# Patient Record
Sex: FEMALE | Race: WHITE | Employment: OTHER | URBAN - METROPOLITAN AREA
[De-identification: names, ages, dates, MRNs, and addresses within clinical notes are randomized per-mention and may not be internally consistent; named-entity substitution may affect disease eponyms.]

---

## 2017-02-04 ENCOUNTER — HOSPITAL ENCOUNTER (OUTPATIENT)
Dept: MAMMOGRAPHY | Age: 72
Discharge: HOME | End: 2017-02-04
Attending: FAMILY MEDICINE
Payer: MEDICARE

## 2017-02-04 ENCOUNTER — LAB ENCOUNTER (OUTPATIENT)
Dept: LAB | Age: 72
End: 2017-02-04
Attending: FAMILY MEDICINE
Payer: MEDICARE

## 2017-02-04 ENCOUNTER — OFFICE VISIT (OUTPATIENT)
Dept: FAMILY MEDICINE CLINIC | Facility: CLINIC | Age: 72
End: 2017-02-04

## 2017-02-04 VITALS
DIASTOLIC BLOOD PRESSURE: 82 MMHG | HEIGHT: 62 IN | BODY MASS INDEX: 43.79 KG/M2 | WEIGHT: 238 LBS | SYSTOLIC BLOOD PRESSURE: 122 MMHG | RESPIRATION RATE: 20 BRPM | HEART RATE: 80 BPM | TEMPERATURE: 98 F

## 2017-02-04 DIAGNOSIS — E89.0 HYPOTHYROIDISM, POSTOP: ICD-10-CM

## 2017-02-04 DIAGNOSIS — I10 BENIGN HYPERTENSION: ICD-10-CM

## 2017-02-04 DIAGNOSIS — I83.893 VARICOSE VEINS OF BOTH LEGS WITH EDEMA: ICD-10-CM

## 2017-02-04 DIAGNOSIS — Z12.11 SCREENING FOR COLON CANCER: ICD-10-CM

## 2017-02-04 DIAGNOSIS — E66.01 MORBID OBESITY DUE TO EXCESS CALORIES (HCC): ICD-10-CM

## 2017-02-04 DIAGNOSIS — R73.03 PREDIABETES: ICD-10-CM

## 2017-02-04 DIAGNOSIS — E55.9 VITAMIN D DEFICIENCY: ICD-10-CM

## 2017-02-04 DIAGNOSIS — E78.2 HYPERLIPIDEMIA, MIXED: ICD-10-CM

## 2017-02-04 DIAGNOSIS — Z12.31 VISIT FOR SCREENING MAMMOGRAM: ICD-10-CM

## 2017-02-04 DIAGNOSIS — Z11.8 SCREENING FOR CHLAMYDIAL DISEASE: ICD-10-CM

## 2017-02-04 DIAGNOSIS — Z23 NEED FOR PNEUMOCOCCAL VACCINATION: ICD-10-CM

## 2017-02-04 DIAGNOSIS — E89.0 HYPOTHYROIDISM, POSTOP: Primary | ICD-10-CM

## 2017-02-04 DIAGNOSIS — Z00.00 ROUTINE GENERAL MEDICAL EXAMINATION AT A HEALTH CARE FACILITY: ICD-10-CM

## 2017-02-04 LAB
25-HYDROXYVITAMIN D (TOTAL): 21.6 NG/ML (ref 30–100)
ALBUMIN SERPL-MCNC: 3.8 G/DL (ref 3.5–4.8)
ALP LIVER SERPL-CCNC: 64 U/L (ref 55–142)
ALT SERPL-CCNC: 36 U/L (ref 14–54)
AST SERPL-CCNC: 28 U/L (ref 15–41)
BASOPHILS # BLD AUTO: 0.03 X10(3) UL (ref 0–0.1)
BASOPHILS NFR BLD AUTO: 0.5 %
BILIRUB SERPL-MCNC: 0.5 MG/DL (ref 0.1–2)
BUN BLD-MCNC: 33 MG/DL (ref 8–20)
CALCIUM BLD-MCNC: 9.4 MG/DL (ref 8.3–10.3)
CHLORIDE: 105 MMOL/L (ref 101–111)
CHOLEST SMN-MCNC: 184 MG/DL (ref ?–200)
CO2: 25 MMOL/L (ref 22–32)
CREAT BLD-MCNC: 1.12 MG/DL (ref 0.55–1.02)
EOSINOPHIL # BLD AUTO: 0.15 X10(3) UL (ref 0–0.3)
EOSINOPHIL NFR BLD AUTO: 2.6 %
ERYTHROCYTE [DISTWIDTH] IN BLOOD BY AUTOMATED COUNT: 12.6 % (ref 11.5–16)
EST. AVERAGE GLUCOSE BLD GHB EST-MCNC: 131 MG/DL (ref 68–126)
GLUCOSE BLD-MCNC: 118 MG/DL (ref 70–99)
HBA1C MFR BLD HPLC: 6.2 % (ref ?–5.7)
HCT VFR BLD AUTO: 39.9 % (ref 34–50)
HDLC SERPL-MCNC: 42 MG/DL (ref 45–?)
HDLC SERPL: 4.38 {RATIO} (ref ?–4.44)
HGB BLD-MCNC: 12.9 G/DL (ref 12–16)
IMMATURE GRANULOCYTE COUNT: 0.02 X10(3) UL (ref 0–1)
IMMATURE GRANULOCYTE RATIO %: 0.4 %
LDLC SERPL CALC-MCNC: 105 MG/DL (ref ?–130)
LYMPHOCYTES # BLD AUTO: 1.72 X10(3) UL (ref 0.9–4)
LYMPHOCYTES NFR BLD AUTO: 30.1 %
M PROTEIN MFR SERPL ELPH: 8 G/DL (ref 6.1–8.3)
MCH RBC QN AUTO: 31.7 PG (ref 27–33.2)
MCHC RBC AUTO-ENTMCNC: 32.3 G/DL (ref 31–37)
MCV RBC AUTO: 98 FL (ref 81–100)
MONOCYTES # BLD AUTO: 0.63 X10(3) UL (ref 0.1–0.6)
MONOCYTES NFR BLD AUTO: 11 %
NEUTROPHIL ABS PRELIM: 3.16 X10 (3) UL (ref 1.3–6.7)
NEUTROPHILS # BLD AUTO: 3.16 X10(3) UL (ref 1.3–6.7)
NEUTROPHILS NFR BLD AUTO: 55.4 %
NONHDLC SERPL-MCNC: 142 MG/DL (ref ?–130)
PLATELET # BLD AUTO: 256 10(3)UL (ref 150–450)
POTASSIUM SERPL-SCNC: 4.3 MMOL/L (ref 3.6–5.1)
RBC # BLD AUTO: 4.07 X10(6)UL (ref 3.8–5.1)
RED CELL DISTRIBUTION WIDTH-SD: 45.1 FL (ref 35.1–46.3)
SODIUM SERPL-SCNC: 138 MMOL/L (ref 136–144)
TRIGLYCERIDES: 187 MG/DL (ref ?–150)
TSI SER-ACNC: 0.18 MIU/ML (ref 0.35–5.5)
VLDL: 37 MG/DL (ref 5–40)
WBC # BLD AUTO: 5.7 X10(3) UL (ref 4–13)

## 2017-02-04 PROCEDURE — 80053 COMPREHEN METABOLIC PANEL: CPT

## 2017-02-04 PROCEDURE — G0009 ADMIN PNEUMOCOCCAL VACCINE: HCPCS | Performed by: FAMILY MEDICINE

## 2017-02-04 PROCEDURE — 80061 LIPID PANEL: CPT

## 2017-02-04 PROCEDURE — 84443 ASSAY THYROID STIM HORMONE: CPT

## 2017-02-04 PROCEDURE — 87591 N.GONORRHOEAE DNA AMP PROB: CPT

## 2017-02-04 PROCEDURE — 85025 COMPLETE CBC W/AUTO DIFF WBC: CPT

## 2017-02-04 PROCEDURE — G0439 PPPS, SUBSEQ VISIT: HCPCS | Performed by: FAMILY MEDICINE

## 2017-02-04 PROCEDURE — 87491 CHLMYD TRACH DNA AMP PROBE: CPT

## 2017-02-04 PROCEDURE — 77067 SCR MAMMO BI INCL CAD: CPT

## 2017-02-04 PROCEDURE — 83036 HEMOGLOBIN GLYCOSYLATED A1C: CPT

## 2017-02-04 PROCEDURE — 96160 PT-FOCUSED HLTH RISK ASSMT: CPT | Performed by: FAMILY MEDICINE

## 2017-02-04 PROCEDURE — 82306 VITAMIN D 25 HYDROXY: CPT

## 2017-02-04 PROCEDURE — 36415 COLL VENOUS BLD VENIPUNCTURE: CPT

## 2017-02-04 PROCEDURE — 90732 PPSV23 VACC 2 YRS+ SUBQ/IM: CPT | Performed by: FAMILY MEDICINE

## 2017-02-04 RX ORDER — LEVOTHYROXINE SODIUM 0.15 MG/1
150 TABLET ORAL DAILY
Qty: 90 TABLET | Refills: 4 | Status: SHIPPED | OUTPATIENT
Start: 2017-02-04 | End: 2018-03-29

## 2017-02-04 RX ORDER — LOVASTATIN 40 MG/1
40 TABLET ORAL NIGHTLY
Qty: 90 TABLET | Refills: 4 | Status: SHIPPED | OUTPATIENT
Start: 2017-02-04 | End: 2018-03-29

## 2017-02-04 RX ORDER — LISINOPRIL AND HYDROCHLOROTHIAZIDE 25; 20 MG/1; MG/1
1 TABLET ORAL DAILY
Qty: 90 TABLET | Refills: 4 | Status: SHIPPED | OUTPATIENT
Start: 2017-02-04 | End: 2018-03-29

## 2017-02-04 NOTE — PATIENT INSTRUCTIONS
Nafisa olivier ci?obed oneil (controlling high blood pressure)  Esme ci?mikayla oneil (high blood pressure) (nadci?nienie) (hypertension) ravinder herrera \"anu dempsey? \". Ren hitesh dlatego, ?e wi?kszo? ? osób z nadci?vladimir holguin spr [place]? stres (stress)  · Znale?? czas na relaks i cieszenie si? ?yciem. ?love? si? ronny najcz??ciej. · Leatha Radha? rodzin? i przyjació? i dara zaniedbywa? swoich hobby. Ograniczy? spo?ywanie alkoholu i rzuci? palenie  · M??czy?ni: Jennifer Cordia nale?y pi? wi?cej ni?

## 2017-02-04 NOTE — PROGRESS NOTES
Natividad Russell is a 67year old female who presents for a MA Supervisit.     HPI:    Patient Care Team: Patient Care Team:  Eleonora Harrison MD as PCP - General    Patient Active Problem List:     Hypothyroidism, postop     Hyperlipidemia, mixed     Screening have you lost more than 10 pounds without trying?: 2 - No    Has your appetite been poor?: No    Type of Diet: Balanced    How does the patient maintain a good energy level?: Other (knee pain  limits exercise)    How would you describe your daily physical Not at all    Feeling down, depressed, or hopeless (over the last two weeks)?: Not at all    PHQ-2 SCORE: 0        Advance Directives     Do you have a healthcare power of ?: No    Do you have a living will?: No     Please go to \"Cognitive Assessm patient. Update Health Maintenance if applicable    Chlamydia  Annually if high risk No results found for: CHLAMYDIA No flowsheet data found.     Screening Mammogram      Mammogram  Annually Mammogram,1 Yr due on 02/18/2016 Update Health Maintenance if appl LDL-CHOLESTEROL (mg/dL (calc))   Date Value   01/29/2015 191*    No flowsheet data found. Dilated Eye exam  Annually No flowsheet data found. No flowsheet data found.     COPD      Spirometry Testing Annually No results found for this or any previo denies hx of anemia  ENDOCRINE: denies thyroid history  ALL/ASTHMA: denies hx of allergy or asthma    EXAM:   /82 mmHg  Pulse 80  Temp(Src) 97.8 °F (36.6 °C) (Oral)  Resp 20  Ht 62\"  Wt 238 lb  BMI 43.52 kg/m2     > BP Readings from Last 3 Encounter VITAMIN D, 25-HYDROXY; Future  Caltrate 600+D once a day recommended. Screening for colon cancer  -     INSURE FECAL GLOBIN by IMMUNOASSAY [[id number]];  Future  Pt refuses colonoscopy, all risk of not having the procedure including risk of colon cancer d/w t Risk/Benefits counseling performed    SUGGESTED VACCINATIONS - Influenza, Pneumococcal, Zoster, Tetanus     Immunization History   Administered Date(s) Administered   • HEP B, Adult 01/29/2015, 03/09/2015, 02/12/2016   • Pneumococcal (Prevnar 13) 01/29/201

## 2017-02-05 LAB
C TRACH DNA SPEC QL NAA+PROBE: NEGATIVE
N GONORRHOEA DNA SPEC QL NAA+PROBE: NEGATIVE

## 2017-02-07 ENCOUNTER — TELEPHONE (OUTPATIENT)
Dept: FAMILY MEDICINE CLINIC | Facility: CLINIC | Age: 72
End: 2017-02-07

## 2017-02-07 NOTE — TELEPHONE ENCOUNTER
----- Message from Oracio Concepcion MD sent at 2/6/2017  8:54 AM CST -----  Vit.  D 2000U a day, low sugar diet, prediabetes

## 2017-02-07 NOTE — TELEPHONE ENCOUNTER
Called and spoke with pt's dtr. Ok per HIPPA pt's dtr informed of lab results below. Pt's dtr states understanding and agrees to plan. Pt's dtr has no further questions at this time.

## 2017-09-28 ENCOUNTER — HOSPITAL ENCOUNTER (OUTPATIENT)
Dept: GENERAL RADIOLOGY | Age: 72
Discharge: HOME | End: 2017-09-28
Attending: FAMILY MEDICINE
Payer: MEDICARE

## 2017-09-28 ENCOUNTER — OFFICE VISIT (OUTPATIENT)
Dept: FAMILY MEDICINE CLINIC | Facility: CLINIC | Age: 72
End: 2017-09-28

## 2017-09-28 ENCOUNTER — TELEPHONE (OUTPATIENT)
Dept: FAMILY MEDICINE CLINIC | Facility: CLINIC | Age: 72
End: 2017-09-28

## 2017-09-28 VITALS
HEART RATE: 84 BPM | RESPIRATION RATE: 16 BRPM | WEIGHT: 240 LBS | SYSTOLIC BLOOD PRESSURE: 120 MMHG | HEIGHT: 62 IN | TEMPERATURE: 100 F | BODY MASS INDEX: 44.16 KG/M2 | DIASTOLIC BLOOD PRESSURE: 60 MMHG | OXYGEN SATURATION: 96 %

## 2017-09-28 DIAGNOSIS — M79.10 MUSCLE ACHE: ICD-10-CM

## 2017-09-28 DIAGNOSIS — R05.9 COUGH: ICD-10-CM

## 2017-09-28 DIAGNOSIS — J20.9 ACUTE BRONCHITIS, UNSPECIFIED ORGANISM: Primary | ICD-10-CM

## 2017-09-28 DIAGNOSIS — J01.00 ACUTE NON-RECURRENT MAXILLARY SINUSITIS: ICD-10-CM

## 2017-09-28 PROCEDURE — 87798 DETECT AGENT NOS DNA AMP: CPT | Performed by: FAMILY MEDICINE

## 2017-09-28 PROCEDURE — 71010 XR CHEST AP/PA (1 VIEW) (CPT=71010): CPT | Performed by: FAMILY MEDICINE

## 2017-09-28 PROCEDURE — 99214 OFFICE O/P EST MOD 30 MIN: CPT | Performed by: FAMILY MEDICINE

## 2017-09-28 PROCEDURE — 87502 INFLUENZA DNA AMP PROBE: CPT | Performed by: FAMILY MEDICINE

## 2017-09-28 RX ORDER — METHYLPREDNISOLONE 4 MG/1
TABLET ORAL
Qty: 1 KIT | Refills: 0 | Status: SHIPPED | OUTPATIENT
Start: 2017-09-28 | End: 2018-07-18 | Stop reason: ALTCHOICE

## 2017-09-28 RX ORDER — AZITHROMYCIN 250 MG/1
TABLET, FILM COATED ORAL
Qty: 6 TABLET | Refills: 0 | Status: SHIPPED | OUTPATIENT
Start: 2017-09-28 | End: 2018-07-18 | Stop reason: ALTCHOICE

## 2017-09-28 RX ORDER — CODEINE PHOSPHATE AND GUAIFENESIN 10; 100 MG/5ML; MG/5ML
5 SOLUTION ORAL 3 TIMES DAILY PRN
Qty: 120 ML | Refills: 0 | Status: SHIPPED
Start: 2017-09-28 | End: 2018-07-18 | Stop reason: ALTCHOICE

## 2017-09-28 NOTE — TELEPHONE ENCOUNTER
Daughter called and spoke with her. Daughter wanted to know if she could give patient another person's cheratussin until pharmacy gets patient's medication ready. Advised her that she cannot take anyone else's medication.   Advised daughter that we would

## 2017-09-28 NOTE — PROGRESS NOTES
Patient presents with:  Cough: X 1 week, cough, weak, fever, headache,       HPI:   Bhavna Camarillo is a 67year old female who presents for cough  for  7  days. Patient reports congestion, cough is keeping pt up at night, wheezing, prior history of bronchitis. fever, no chills. SKIN: no rashes, no hives. EYES:denies blurred vision or double vision,   HEENT: no earache, sore throat, mild sinus congestion. CHEST: no chest pains, no palpitations, no orthopnea.   LUNGS: denies shortness of breath with exertion or if sx's persist or worsen.

## 2017-09-29 ENCOUNTER — TELEPHONE (OUTPATIENT)
Dept: FAMILY MEDICINE CLINIC | Facility: CLINIC | Age: 72
End: 2017-09-29

## 2017-09-29 NOTE — TELEPHONE ENCOUNTER
Dtr notified per HIPPA,  of + Influenza A & below orders. All questions answered, she expresses understanding.

## 2017-12-06 ENCOUNTER — TELEPHONE (OUTPATIENT)
Dept: FAMILY MEDICINE CLINIC | Facility: CLINIC | Age: 72
End: 2017-12-06

## 2018-01-12 ENCOUNTER — TELEPHONE (OUTPATIENT)
Dept: FAMILY MEDICINE CLINIC | Facility: CLINIC | Age: 73
End: 2018-01-12

## 2018-03-29 DIAGNOSIS — E78.2 HYPERLIPIDEMIA, MIXED: ICD-10-CM

## 2018-03-29 DIAGNOSIS — I10 BENIGN HYPERTENSION: ICD-10-CM

## 2018-03-29 DIAGNOSIS — E89.0 HYPOTHYROIDISM, POSTOP: ICD-10-CM

## 2018-03-29 RX ORDER — LOVASTATIN 40 MG/1
TABLET ORAL
Qty: 90 TABLET | Refills: 0 | Status: SHIPPED | OUTPATIENT
Start: 2018-03-29 | End: 2018-07-01

## 2018-03-29 RX ORDER — LEVOTHYROXINE SODIUM 0.15 MG/1
TABLET ORAL
Qty: 90 TABLET | Refills: 0 | Status: SHIPPED | OUTPATIENT
Start: 2018-03-29 | End: 2018-07-01

## 2018-03-29 RX ORDER — LISINOPRIL AND HYDROCHLOROTHIAZIDE 25; 20 MG/1; MG/1
1 TABLET ORAL DAILY
Qty: 90 TABLET | Refills: 0 | Status: SHIPPED | OUTPATIENT
Start: 2018-03-29 | End: 2018-07-01

## 2018-03-29 NOTE — TELEPHONE ENCOUNTER
Patient's daughter called to check on status of 3 medications to be refill at the [facility].   Levothyroxine Sodium 150 MCG Oral Tab 90 tablet 4     Lisinopril-Hydrochlorothiazide 20-25 MG Oral Tab 90 tablet 4     Lovastatin 40 MG Oral Tab 90

## 2018-06-07 ENCOUNTER — TELEPHONE (OUTPATIENT)
Dept: FAMILY MEDICINE CLINIC | Facility: CLINIC | Age: 73
End: 2018-06-07

## 2018-07-01 DIAGNOSIS — E78.2 HYPERLIPIDEMIA, MIXED: ICD-10-CM

## 2018-07-01 DIAGNOSIS — I10 BENIGN HYPERTENSION: ICD-10-CM

## 2018-07-01 DIAGNOSIS — E89.0 HYPOTHYROIDISM, POSTOP: ICD-10-CM

## 2018-07-02 RX ORDER — LEVOTHYROXINE SODIUM 0.15 MG/1
TABLET ORAL
Qty: 90 TABLET | Refills: 0 | Status: SHIPPED | OUTPATIENT
Start: 2018-07-02 | End: 2018-07-18

## 2018-07-02 RX ORDER — LISINOPRIL AND HYDROCHLOROTHIAZIDE 25; 20 MG/1; MG/1
1 TABLET ORAL DAILY
Qty: 90 TABLET | Refills: 0 | Status: SHIPPED | OUTPATIENT
Start: 2018-07-02 | End: 2018-07-18

## 2018-07-02 RX ORDER — LOVASTATIN 40 MG/1
TABLET ORAL
Qty: 90 TABLET | Refills: 0 | Status: SHIPPED | OUTPATIENT
Start: 2018-07-02 | End: 2018-07-18

## 2018-07-02 NOTE — TELEPHONE ENCOUNTER
Received a call from daughter Elvin Allie is on HIPPA. She is checking the status.  Please call as soon as they are approved or denied at [phone]

## 2018-07-02 NOTE — TELEPHONE ENCOUNTER
Medication(s) to Refill:   Pending Prescriptions Disp Refills    LEVOTHYROXINE SODIUM 150 MCG Oral Tab [Pharmacy Med Name: LEVOTHYROXINE 0.150MG (150MCG) TAB] 90 tablet 0     Sig: TAKE 1 TABLET(150 MCG) BY MOUTH DAILY      LISINOPRIL-HYDROCHLOROTHIAZIDE 20

## 2018-07-02 NOTE — TELEPHONE ENCOUNTER
PSR: Pls contact pt daughter Sarah Baldwin to schedule pt for OV follow up. Refills may be dependent on follow up. (please see msgs below). Thank you.

## 2018-07-18 ENCOUNTER — HOSPITAL ENCOUNTER (OUTPATIENT)
Dept: MAMMOGRAPHY | Age: 73
Discharge: HOME | End: 2018-07-18
Attending: FAMILY MEDICINE
Payer: MEDICARE

## 2018-07-18 ENCOUNTER — OFFICE VISIT (OUTPATIENT)
Dept: FAMILY MEDICINE CLINIC | Facility: CLINIC | Age: 73
End: 2018-07-18

## 2018-07-18 ENCOUNTER — LAB ENCOUNTER (OUTPATIENT)
Dept: LAB | Age: 73
End: 2018-07-18
Attending: FAMILY MEDICINE
Payer: MEDICARE

## 2018-07-18 VITALS
RESPIRATION RATE: 22 BRPM | BODY MASS INDEX: 42.51 KG/M2 | HEIGHT: 62 IN | WEIGHT: 231 LBS | OXYGEN SATURATION: 99 % | DIASTOLIC BLOOD PRESSURE: 86 MMHG | HEART RATE: 84 BPM | TEMPERATURE: 99 F | SYSTOLIC BLOOD PRESSURE: 128 MMHG

## 2018-07-18 DIAGNOSIS — Z12.31 VISIT FOR SCREENING MAMMOGRAM: ICD-10-CM

## 2018-07-18 DIAGNOSIS — Z12.11 SCREENING FOR COLON CANCER: ICD-10-CM

## 2018-07-18 DIAGNOSIS — E78.2 HYPERLIPIDEMIA, MIXED: ICD-10-CM

## 2018-07-18 DIAGNOSIS — Z12.31 VISIT FOR SCREENING MAMMOGRAM: Primary | ICD-10-CM

## 2018-07-18 DIAGNOSIS — E66.01 MORBID OBESITY DUE TO EXCESS CALORIES (HCC): ICD-10-CM

## 2018-07-18 DIAGNOSIS — I83.893 VARICOSE VEINS OF BOTH LEGS WITH EDEMA: ICD-10-CM

## 2018-07-18 DIAGNOSIS — E89.0 HYPOTHYROIDISM, POSTOP: ICD-10-CM

## 2018-07-18 DIAGNOSIS — R73.03 PREDIABETES: ICD-10-CM

## 2018-07-18 DIAGNOSIS — I10 BENIGN HYPERTENSION: ICD-10-CM

## 2018-07-18 LAB
ALBUMIN SERPL-MCNC: 3.8 G/DL (ref 3.5–4.8)
ALP LIVER SERPL-CCNC: 62 U/L (ref 55–142)
ALT SERPL-CCNC: 48 U/L (ref 14–54)
AST SERPL-CCNC: 35 U/L (ref 15–41)
BASOPHILS # BLD AUTO: 0.05 X10(3) UL (ref 0–0.1)
BASOPHILS NFR BLD AUTO: 0.8 %
BILIRUB SERPL-MCNC: 0.4 MG/DL (ref 0.1–2)
BUN BLD-MCNC: 29 MG/DL (ref 8–20)
CALCIUM BLD-MCNC: 9.9 MG/DL (ref 8.3–10.3)
CHLORIDE: 106 MMOL/L (ref 101–111)
CHOLEST SMN-MCNC: 178 MG/DL (ref ?–200)
CO2: 28 MMOL/L (ref 22–32)
CREAT BLD-MCNC: 1.22 MG/DL (ref 0.55–1.02)
EOSINOPHIL # BLD AUTO: 0.18 X10(3) UL (ref 0–0.3)
EOSINOPHIL NFR BLD AUTO: 2.8 %
ERYTHROCYTE [DISTWIDTH] IN BLOOD BY AUTOMATED COUNT: 12.4 % (ref 11.5–16)
GLUCOSE BLD-MCNC: 104 MG/DL (ref 70–99)
HCT VFR BLD AUTO: 42 % (ref 34–50)
HDLC SERPL-MCNC: 47 MG/DL (ref 45–?)
HDLC SERPL: 3.79 {RATIO} (ref ?–4.44)
HGB BLD-MCNC: 13.2 G/DL (ref 12–16)
IMMATURE GRANULOCYTE COUNT: 0.02 X10(3) UL (ref 0–1)
IMMATURE GRANULOCYTE RATIO %: 0.3 %
LDLC SERPL CALC-MCNC: 91 MG/DL (ref ?–130)
LYMPHOCYTES # BLD AUTO: 2.15 X10(3) UL (ref 0.9–4)
LYMPHOCYTES NFR BLD AUTO: 34 %
M PROTEIN MFR SERPL ELPH: 8.4 G/DL (ref 6.1–8.3)
MCH RBC QN AUTO: 30.3 PG (ref 27–33.2)
MCHC RBC AUTO-ENTMCNC: 31.4 G/DL (ref 31–37)
MCV RBC AUTO: 96.6 FL (ref 81–100)
MONOCYTES # BLD AUTO: 0.6 X10(3) UL (ref 0.1–1)
MONOCYTES NFR BLD AUTO: 9.5 %
NEUTROPHIL ABS PRELIM: 3.32 X10 (3) UL (ref 1.3–6.7)
NEUTROPHILS # BLD AUTO: 3.32 X10(3) UL (ref 1.3–6.7)
NEUTROPHILS NFR BLD AUTO: 52.6 %
NONHDLC SERPL-MCNC: 131 MG/DL (ref ?–130)
PLATELET # BLD AUTO: 257 10(3)UL (ref 150–450)
POTASSIUM SERPL-SCNC: 4.5 MMOL/L (ref 3.6–5.1)
RBC # BLD AUTO: 4.35 X10(6)UL (ref 3.8–5.1)
RED CELL DISTRIBUTION WIDTH-SD: 44.2 FL (ref 35.1–46.3)
SODIUM SERPL-SCNC: 141 MMOL/L (ref 136–144)
TRIGL SERPL-MCNC: 199 MG/DL (ref ?–150)
TSI SER-ACNC: 0.14 MIU/ML (ref 0.35–5.5)
VLDLC SERPL CALC-MCNC: 40 MG/DL (ref 5–40)
WBC # BLD AUTO: 6.3 X10(3) UL (ref 4–13)

## 2018-07-18 PROCEDURE — 80053 COMPREHEN METABOLIC PANEL: CPT

## 2018-07-18 PROCEDURE — 96160 PT-FOCUSED HLTH RISK ASSMT: CPT | Performed by: FAMILY MEDICINE

## 2018-07-18 PROCEDURE — 77067 SCR MAMMO BI INCL CAD: CPT | Performed by: FAMILY MEDICINE

## 2018-07-18 PROCEDURE — 85025 COMPLETE CBC W/AUTO DIFF WBC: CPT

## 2018-07-18 PROCEDURE — G0439 PPPS, SUBSEQ VISIT: HCPCS | Performed by: FAMILY MEDICINE

## 2018-07-18 PROCEDURE — 80061 LIPID PANEL: CPT

## 2018-07-18 PROCEDURE — 84443 ASSAY THYROID STIM HORMONE: CPT

## 2018-07-18 PROCEDURE — 36415 COLL VENOUS BLD VENIPUNCTURE: CPT

## 2018-07-18 RX ORDER — LEVOTHYROXINE SODIUM 0.15 MG/1
TABLET ORAL
Qty: 90 TABLET | Refills: 4 | Status: SHIPPED | OUTPATIENT
Start: 2018-07-18 | End: 2018-07-31 | Stop reason: DRUGHIGH

## 2018-07-18 RX ORDER — LOVASTATIN 40 MG/1
TABLET ORAL
Qty: 90 TABLET | Refills: 4 | Status: SHIPPED | OUTPATIENT
Start: 2018-07-18 | End: 2019-08-05

## 2018-07-18 RX ORDER — LISINOPRIL AND HYDROCHLOROTHIAZIDE 25; 20 MG/1; MG/1
1 TABLET ORAL DAILY
Qty: 90 TABLET | Refills: 4 | Status: SHIPPED | OUTPATIENT
Start: 2018-07-18 | End: 2019-08-05

## 2018-07-18 NOTE — PROGRESS NOTES
REASON FOR VISIT:    Pam Espino is a 68year old female who presents for a MA Supervisit.     HPI:     Patient Care Team: Patient Care Team:  Owen Hdz MD as PCP - General    Patient Active Problem List:     Hypothyroidism, postop     Hyperlipidemia daily physical activity?: Light  How would you describe your current health state?: Good  How do you maintain positive mental well-being?: Social Interaction; Visiting Friends; Visiting Family (reads,cooks,watches polish movies)  If you are a male age 38-65 118 (H)   ----------  GLUCOSE (mg/dL)   Date Value   01/29/2015 116 (H)   ----------   Cardiovascular Disease Screening    LDL Annually LDL-CHOLESTEROL (mg/dL (calc))   Date Value   01/29/2015 191 (H)     LDL Cholesterol (mg/dL)   Date Value   02/04/2017 1 or double vision  HEENT: denies nasal congestion, sinus pain or ST  LUNGS: denies shortness of breath with exertion  CARDIOVASCULAR: denies chest pain on exertion  GI: denies abdominal pain, denies heartburn  : denies dysuria, vaginal discharge or itchin side effects vs benefits d/w the pt. Medications:   -     Lisinopril-Hydrochlorothiazide 20-25 MG Oral Tab; Take 1 tablet by mouth daily.     Hyperlipidemia, mixed  -     Lovastatin 40 MG Oral Tab; TAKE 1 TABLET(40 MG) BY MOUTH EVERY NIGHT  -     LIPID CASTILLO

## 2018-07-19 ENCOUNTER — TELEPHONE (OUTPATIENT)
Dept: FAMILY MEDICINE CLINIC | Facility: CLINIC | Age: 73
End: 2018-07-19

## 2018-07-19 ENCOUNTER — MED REC SCAN ONLY (OUTPATIENT)
Dept: FAMILY MEDICINE CLINIC | Facility: CLINIC | Age: 73
End: 2018-07-19

## 2018-07-19 DIAGNOSIS — E89.0 HYPOTHYROIDISM, POSTOP: Primary | ICD-10-CM

## 2018-07-19 NOTE — TELEPHONE ENCOUNTER
----- Message from Lisy Bowden MD sent at 7/19/2018  9:24 AM CDT -----  Pt needs Synthroid 137ug a day not 150, recheck TSH in 2 months. Low lipid diet, proper hydration.

## 2018-07-26 NOTE — TELEPHONE ENCOUNTER
PSR if patient calls back please find out if  is needed. If so we will need to call her back with the . 2nd attempt  Called patient using interpretation line. ID# [id number].  left patient a VM to call back.

## 2018-07-31 RX ORDER — LEVOTHYROXINE SODIUM 137 UG/1
137 TABLET ORAL
Qty: 90 TABLET | Refills: 0 | Status: SHIPPED | OUTPATIENT
Start: 2018-07-31 | End: 2019-01-10

## 2018-07-31 NOTE — TELEPHONE ENCOUNTER
Spoke to dtr per HIPPA & notified her of lab results & below orders. Rx sent. Lab in [facility]. All questions answered, dtr expresses understanding.

## 2019-01-11 RX ORDER — LEVOTHYROXINE SODIUM 137 UG/1
TABLET ORAL
Qty: 90 TABLET | Refills: 0 | Status: SHIPPED | OUTPATIENT
Start: 2019-01-11 | End: 2019-05-08

## 2019-03-21 PROBLEM — N18.30 CKD (CHRONIC KIDNEY DISEASE) STAGE 3, GFR 30-59 ML/MIN (HCC): Status: ACTIVE | Noted: 2019-03-21

## 2019-04-09 RX ORDER — LEVOTHYROXINE SODIUM 137 UG/1
TABLET ORAL
Qty: 30 TABLET | Refills: 0 | OUTPATIENT
Start: 2019-04-09

## 2019-04-09 NOTE — TELEPHONE ENCOUNTER
Pt unknown to myself. She is a MA pt who has chosen me as PCP. Needs appointment. Has been called to make new pt visit appt. Will not refill meds until seen.

## 2019-05-07 PROBLEM — M25.561 CHRONIC PAIN OF BOTH KNEES: Status: ACTIVE | Noted: 2019-05-07

## 2019-05-07 PROBLEM — Z12.31 VISIT FOR SCREENING MAMMOGRAM: Status: ACTIVE | Noted: 2019-05-07

## 2019-05-07 PROBLEM — Z78.0 MENOPAUSE: Status: ACTIVE | Noted: 2019-05-07

## 2019-05-07 PROBLEM — R06.02 SOB (SHORTNESS OF BREATH): Status: ACTIVE | Noted: 2019-05-07

## 2019-05-07 PROBLEM — M25.562 CHRONIC PAIN OF BOTH KNEES: Status: ACTIVE | Noted: 2019-05-07

## 2019-05-07 PROBLEM — G89.29 CHRONIC PAIN OF BOTH KNEES: Status: ACTIVE | Noted: 2019-05-07

## 2019-08-03 DIAGNOSIS — I10 BENIGN HYPERTENSION: ICD-10-CM

## 2019-08-03 DIAGNOSIS — E78.2 HYPERLIPIDEMIA, MIXED: ICD-10-CM

## 2019-08-04 RX ORDER — LISINOPRIL AND HYDROCHLOROTHIAZIDE 25; 20 MG/1; MG/1
1 TABLET ORAL DAILY
Qty: 30 TABLET | Refills: 0 | OUTPATIENT
Start: 2019-08-04

## 2019-08-04 RX ORDER — LOVASTATIN 40 MG/1
TABLET ORAL
Qty: 30 TABLET | Refills: 0 | OUTPATIENT
Start: 2019-08-04

## 2019-08-05 DIAGNOSIS — I10 BENIGN HYPERTENSION: ICD-10-CM

## 2019-08-05 DIAGNOSIS — E78.2 HYPERLIPIDEMIA, MIXED: ICD-10-CM

## 2019-08-06 RX ORDER — LOVASTATIN 40 MG/1
TABLET ORAL
Qty: 90 TABLET | Refills: 0 | Status: SHIPPED | OUTPATIENT
Start: 2019-08-06 | End: 2019-11-04

## 2019-08-06 RX ORDER — LISINOPRIL AND HYDROCHLOROTHIAZIDE 25; 20 MG/1; MG/1
1 TABLET ORAL DAILY
Qty: 90 TABLET | Refills: 0 | Status: SHIPPED | OUTPATIENT
Start: 2019-08-06 | End: 2019-11-04

## 2019-10-29 DIAGNOSIS — I10 BENIGN HYPERTENSION: ICD-10-CM

## 2019-10-29 DIAGNOSIS — E78.2 HYPERLIPIDEMIA, MIXED: ICD-10-CM

## 2019-10-30 RX ORDER — LISINOPRIL AND HYDROCHLOROTHIAZIDE 25; 20 MG/1; MG/1
1 TABLET ORAL DAILY
Qty: 90 TABLET | Refills: 0 | OUTPATIENT
Start: 2019-10-30

## 2019-10-30 RX ORDER — LOVASTATIN 40 MG/1
TABLET ORAL
Qty: 90 TABLET | Refills: 0 | OUTPATIENT
Start: 2019-10-30

## 2019-12-04 PROBLEM — Z23 NEED FOR INFLUENZA VACCINATION: Status: ACTIVE | Noted: 2019-12-04

## 2019-12-21 NOTE — TELEPHONE ENCOUNTER
[place] collar has been placed on Pt   Micheletm on Pt phone to call back and schedule her yearly MA Supervisit because she is over due

## 2020-02-27 PROCEDURE — 88305 TISSUE EXAM BY PATHOLOGIST: CPT | Performed by: UROLOGY

## 2021-03-17 PROBLEM — K51.40 PSEUDOPOLYP OF ASCENDING COLON WITHOUT COMPLICATION (HCC): Status: ACTIVE | Noted: 2021-03-17

## 2021-08-27 ENCOUNTER — TELEPHONE (OUTPATIENT)
Dept: CARDIOLOGY | Age: 76
End: 2021-08-27

## 2021-09-02 ENCOUNTER — HOSPITAL ENCOUNTER (OUTPATIENT)
Dept: GENERAL RADIOLOGY | Age: 76
Discharge: HOME | End: 2021-09-02
Attending: INTERNAL MEDICINE

## 2021-09-02 ENCOUNTER — HOSPITAL ENCOUNTER (OUTPATIENT)
Dept: CARDIOLOGY | Age: 76
Discharge: HOME | End: 2021-09-02
Attending: INTERNAL MEDICINE

## 2021-09-02 DIAGNOSIS — I10 ESSENTIAL (PRIMARY) HYPERTENSION: ICD-10-CM

## 2021-09-02 DIAGNOSIS — R06.00 DYSPNEA: ICD-10-CM

## 2021-09-02 DIAGNOSIS — G89.29 CHRONIC KNEE PAIN: ICD-10-CM

## 2021-09-02 DIAGNOSIS — M25.569 CHRONIC KNEE PAIN: ICD-10-CM

## 2021-09-02 DIAGNOSIS — I49.3 PREMATURE VENTRICULAR CONTRACTIONS: ICD-10-CM

## 2021-09-02 PROCEDURE — 93306 TTE W/DOPPLER COMPLETE: CPT | Performed by: INTERNAL MEDICINE

## 2021-09-02 PROCEDURE — 73564 X-RAY EXAM KNEE 4 OR MORE: CPT

## 2021-09-02 PROCEDURE — 93306 TTE W/DOPPLER COMPLETE: CPT

## 2021-10-18 ENCOUNTER — APPOINTMENT (OUTPATIENT)
Dept: CARDIOLOGY | Age: 76
End: 2021-10-18

## 2021-10-18 ENCOUNTER — OFFICE VISIT (OUTPATIENT)
Dept: CARDIOLOGY | Age: 76
End: 2021-10-18

## 2021-10-18 VITALS
SYSTOLIC BLOOD PRESSURE: 140 MMHG | DIASTOLIC BLOOD PRESSURE: 72 MMHG | OXYGEN SATURATION: 99 % | WEIGHT: 225 LBS | HEART RATE: 78 BPM

## 2021-10-18 DIAGNOSIS — E78.2 MIXED HYPERLIPIDEMIA: ICD-10-CM

## 2021-10-18 DIAGNOSIS — I10 PRIMARY HYPERTENSION: ICD-10-CM

## 2021-10-18 DIAGNOSIS — R94.31 ABNORMAL ELECTROCARDIOGRAM (ECG) (EKG): ICD-10-CM

## 2021-10-18 DIAGNOSIS — I10 ESSENTIAL (PRIMARY) HYPERTENSION: Primary | ICD-10-CM

## 2021-10-18 PROCEDURE — 99203 OFFICE O/P NEW LOW 30 MIN: CPT | Performed by: INTERNAL MEDICINE

## 2021-10-18 PROCEDURE — 93000 ELECTROCARDIOGRAM COMPLETE: CPT | Performed by: INTERNAL MEDICINE

## 2021-10-18 RX ORDER — LEVOTHYROXINE SODIUM 0.12 MG/1
125 TABLET ORAL
COMMUNITY
Start: 2021-03-17 | End: 2022-03-12

## 2021-10-18 RX ORDER — LISINOPRIL 20 MG/1
TABLET ORAL
COMMUNITY
Start: 2021-10-16

## 2021-10-18 RX ORDER — ATORVASTATIN CALCIUM 40 MG/1
40 TABLET, FILM COATED ORAL DAILY
COMMUNITY
Start: 2021-09-07

## 2021-10-18 RX ORDER — ESCITALOPRAM OXALATE 10 MG/1
10 TABLET ORAL DAILY
COMMUNITY
Start: 2021-08-21

## 2021-10-18 ASSESSMENT — ENCOUNTER SYMPTOMS
DIARRHEA: 0
EYE PAIN: 0
HALLUCINATIONS: 0
EYE DISCHARGE: 0
CONSTIPATION: 0
APNEA: 0
NUMBNESS: 0
SHORTNESS OF BREATH: 0
COLOR CHANGE: 0
BACK PAIN: 0
FACIAL ASYMMETRY: 0
SINUS PAIN: 0
ABDOMINAL DISTENTION: 0
SLEEP DISTURBANCE: 0
LIGHT-HEADEDNESS: 0
VOMITING: 0
ACTIVITY CHANGE: 0
HEADACHES: 0
UNEXPECTED WEIGHT CHANGE: 0
FEVER: 0
CONFUSION: 0
SPEECH DIFFICULTY: 0
ABDOMINAL PAIN: 0
WEAKNESS: 0
BLOOD IN STOOL: 0
APPETITE CHANGE: 0
WHEEZING: 0
FACIAL SWELLING: 0
CHEST TIGHTNESS: 0

## 2022-06-20 ENCOUNTER — HOSPITAL ENCOUNTER (OUTPATIENT)
Dept: GENERAL RADIOLOGY | Age: 77
Discharge: HOME | End: 2022-06-20

## 2022-06-20 DIAGNOSIS — R05.9 COUGH: ICD-10-CM

## 2022-06-20 PROCEDURE — 71046 X-RAY EXAM CHEST 2 VIEWS: CPT

## 2023-04-14 DIAGNOSIS — M81.0 OSTEOPOROSIS, UNSPECIFIED OSTEOPOROSIS TYPE, UNSPECIFIED PATHOLOGICAL FRACTURE PRESENCE: Primary | ICD-10-CM

## 2023-05-25 ENCOUNTER — HOSPITAL ENCOUNTER (OUTPATIENT)
Dept: GENERAL RADIOLOGY | Age: 78
Discharge: HOME | End: 2023-05-25
Attending: INTERNAL MEDICINE

## 2023-05-25 DIAGNOSIS — M81.0 OSTEOPOROSIS, UNSPECIFIED OSTEOPOROSIS TYPE, UNSPECIFIED PATHOLOGICAL FRACTURE PRESENCE: ICD-10-CM

## 2023-05-25 PROCEDURE — 77080 DXA BONE DENSITY AXIAL: CPT

## 2024-01-04 ENCOUNTER — HOSPITAL ENCOUNTER (EMERGENCY)
Age: 79
Discharge: HOME | End: 2024-01-05
Attending: STUDENT IN AN ORGANIZED HEALTH CARE EDUCATION/TRAINING PROGRAM

## 2024-01-04 DIAGNOSIS — H60.391 OTHER INFECTIVE ACUTE OTITIS EXTERNA OF RIGHT EAR: Primary | ICD-10-CM

## 2024-01-04 LAB
FLUAV RNA RESP QL NAA+PROBE: NOT DETECTED
FLUBV RNA RESP QL NAA+PROBE: NOT DETECTED
RSV AG NPH QL IA.RAPID: NOT DETECTED
SARS-COV-2 RNA RESP QL NAA+PROBE: NOT DETECTED
SERVICE CMNT-IMP: NORMAL
SERVICE CMNT-IMP: NORMAL

## 2024-01-04 PROCEDURE — 0241U COVID/FLU/RSV PANEL: CPT | Performed by: EMERGENCY MEDICINE

## 2024-01-05 ENCOUNTER — APPOINTMENT (OUTPATIENT)
Dept: CT IMAGING | Age: 79
End: 2024-01-05
Attending: STUDENT IN AN ORGANIZED HEALTH CARE EDUCATION/TRAINING PROGRAM

## 2024-01-05 VITALS
RESPIRATION RATE: 18 BRPM | HEIGHT: 61 IN | TEMPERATURE: 98.2 F | BODY MASS INDEX: 41 KG/M2 | WEIGHT: 217.15 LBS | DIASTOLIC BLOOD PRESSURE: 84 MMHG | SYSTOLIC BLOOD PRESSURE: 154 MMHG | HEART RATE: 93 BPM | OXYGEN SATURATION: 92 %

## 2024-01-05 LAB
ALBUMIN SERPL-MCNC: 3.7 G/DL (ref 3.6–5.1)
ALBUMIN/GLOB SERPL: 0.8 {RATIO} (ref 1–2.4)
ALP SERPL-CCNC: 90 UNITS/L (ref 45–117)
ALT SERPL-CCNC: 31 UNITS/L
ANION GAP SERPL CALC-SCNC: 8 MMOL/L (ref 7–19)
AST SERPL-CCNC: 20 UNITS/L
BASOPHILS # BLD: 0.1 K/MCL (ref 0–0.3)
BASOPHILS NFR BLD: 1 %
BILIRUB SERPL-MCNC: 0.6 MG/DL (ref 0.2–1)
BUN SERPL-MCNC: 26 MG/DL (ref 6–20)
BUN/CREAT SERPL: 29 (ref 7–25)
CALCIUM SERPL-MCNC: 9.9 MG/DL (ref 8.4–10.2)
CHLORIDE SERPL-SCNC: 108 MMOL/L (ref 97–110)
CO2 SERPL-SCNC: 24 MMOL/L (ref 21–32)
CREAT SERPL-MCNC: 0.91 MG/DL (ref 0.51–0.95)
DEPRECATED RDW RBC: 44.7 FL (ref 39–50)
EGFRCR SERPLBLD CKD-EPI 2021: 65 ML/MIN/{1.73_M2}
EOSINOPHIL # BLD: 0.2 K/MCL (ref 0–0.5)
EOSINOPHIL NFR BLD: 2 %
ERYTHROCYTE [DISTWIDTH] IN BLOOD: 12.5 % (ref 11–15)
FASTING DURATION TIME PATIENT: ABNORMAL H
GLOBULIN SER-MCNC: 4.9 G/DL (ref 2–4)
GLUCOSE SERPL-MCNC: 122 MG/DL (ref 70–99)
HCT VFR BLD CALC: 38.3 % (ref 36–46.5)
HGB BLD-MCNC: 12.3 G/DL (ref 12–15.5)
IMM GRANULOCYTES # BLD AUTO: 0 K/MCL (ref 0–0.2)
IMM GRANULOCYTES # BLD: 0 %
LYMPHOCYTES # BLD: 2.5 K/MCL (ref 1–4)
LYMPHOCYTES NFR BLD: 25 %
MCH RBC QN AUTO: 30.9 PG (ref 26–34)
MCHC RBC AUTO-ENTMCNC: 32.1 G/DL (ref 32–36.5)
MCV RBC AUTO: 96.2 FL (ref 78–100)
MONOCYTES # BLD: 1 K/MCL (ref 0.3–0.9)
MONOCYTES NFR BLD: 10 %
NEUTROPHILS # BLD: 6.3 K/MCL (ref 1.8–7.7)
NEUTROPHILS NFR BLD: 62 %
NRBC BLD MANUAL-RTO: 0 /100 WBC
PLATELET # BLD AUTO: 243 K/MCL (ref 140–450)
POTASSIUM SERPL-SCNC: 4 MMOL/L (ref 3.4–5.1)
PROT SERPL-MCNC: 8.6 G/DL (ref 6.4–8.2)
RBC # BLD: 3.98 MIL/MCL (ref 4–5.2)
SODIUM SERPL-SCNC: 136 MMOL/L (ref 135–145)
WBC # BLD: 10.1 K/MCL (ref 4.2–11)

## 2024-01-05 PROCEDURE — 70460 CT HEAD/BRAIN W/DYE: CPT

## 2024-01-05 PROCEDURE — 10002805 HB CONTRAST AGENT: Performed by: STUDENT IN AN ORGANIZED HEALTH CARE EDUCATION/TRAINING PROGRAM

## 2024-01-05 PROCEDURE — 85025 COMPLETE CBC W/AUTO DIFF WBC: CPT | Performed by: STUDENT IN AN ORGANIZED HEALTH CARE EDUCATION/TRAINING PROGRAM

## 2024-01-05 PROCEDURE — 80053 COMPREHEN METABOLIC PANEL: CPT | Performed by: STUDENT IN AN ORGANIZED HEALTH CARE EDUCATION/TRAINING PROGRAM

## 2024-01-05 PROCEDURE — 99284 EMERGENCY DEPT VISIT MOD MDM: CPT | Performed by: STUDENT IN AN ORGANIZED HEALTH CARE EDUCATION/TRAINING PROGRAM

## 2024-01-05 PROCEDURE — 10002803 HB RX 637: Performed by: STUDENT IN AN ORGANIZED HEALTH CARE EDUCATION/TRAINING PROGRAM

## 2024-01-05 RX ORDER — CIPROFLOXACIN AND DEXAMETHASONE 3; 1 MG/ML; MG/ML
4 SUSPENSION/ DROPS AURICULAR (OTIC) 2 TIMES DAILY
Qty: 7.5 ML | Refills: 1 | Status: SHIPPED | OUTPATIENT
Start: 2024-01-05 | End: 2024-01-12

## 2024-01-05 RX ORDER — CIPROFLOXACIN AND DEXAMETHASONE 3; 1 MG/ML; MG/ML
4 SUSPENSION/ DROPS AURICULAR (OTIC) 2 TIMES DAILY
Status: DISCONTINUED | OUTPATIENT
Start: 2024-01-05 | End: 2024-01-05 | Stop reason: HOSPADM

## 2024-01-05 RX ADMIN — IOHEXOL 80 ML: 350 INJECTION, SOLUTION INTRAVENOUS at 02:25

## 2024-01-05 RX ADMIN — CIPROFLOXACIN AND DEXAMETHASONE 4 DROP: 3; 1 SUSPENSION/ DROPS AURICULAR (OTIC) at 04:56

## 2024-01-05 ASSESSMENT — PAIN SCALES - GENERAL: PAINLEVEL_OUTOF10: 4

## 2024-10-11 ENCOUNTER — HOSPITAL ENCOUNTER (OUTPATIENT)
Dept: VASCULAR LAB | Age: 79
End: 2024-10-11
Attending: INTERNAL MEDICINE

## 2024-10-11 DIAGNOSIS — I73.9 CLAUDICATION (CMD): ICD-10-CM

## 2024-10-11 PROCEDURE — 93925 LOWER EXTREMITY STUDY: CPT

## 2024-11-06 ENCOUNTER — APPOINTMENT (OUTPATIENT)
Dept: VASCULAR SURGERY | Age: 79
End: 2024-11-06

## 2024-11-06 VITALS
HEART RATE: 88 BPM | RESPIRATION RATE: 18 BRPM | DIASTOLIC BLOOD PRESSURE: 73 MMHG | BODY MASS INDEX: 39.13 KG/M2 | HEIGHT: 61 IN | WEIGHT: 207.23 LBS | TEMPERATURE: 97.8 F | SYSTOLIC BLOOD PRESSURE: 147 MMHG | OXYGEN SATURATION: 98 %

## 2024-11-06 DIAGNOSIS — I73.9 PAD (PERIPHERAL ARTERY DISEASE) (CMD): Primary | ICD-10-CM

## 2024-11-06 RX ORDER — GINSENG 100 MG
CAPSULE ORAL
COMMUNITY

## 2024-11-06 RX ORDER — MULTIVIT WITH MINERALS/LUTEIN
250 TABLET ORAL DAILY
COMMUNITY

## 2024-11-06 RX ORDER — ASPIRIN 81 MG/1
81 TABLET ORAL DAILY
COMMUNITY

## 2025-01-07 ENCOUNTER — APPOINTMENT (OUTPATIENT)
Dept: VASCULAR SURGERY | Age: 80
End: 2025-01-07

## 2025-02-25 DIAGNOSIS — M17.0 PRIMARY OSTEOARTHRITIS OF BOTH KNEES: Primary | ICD-10-CM

## 2025-03-03 ENCOUNTER — HOSPITAL ENCOUNTER (OUTPATIENT)
Dept: PHYSICAL MEDICINE AND REHAB | Age: 80
Discharge: STILL ADMITTED OR ADMINISTRATIVE | End: 2025-03-03
Attending: INTERNAL MEDICINE

## 2025-03-03 PROCEDURE — 97161 PT EVAL LOW COMPLEX 20 MIN: CPT

## 2025-03-03 PROCEDURE — 97140 MANUAL THERAPY 1/> REGIONS: CPT

## 2025-03-03 PROCEDURE — 97110 THERAPEUTIC EXERCISES: CPT

## 2025-03-03 ASSESSMENT — MOVEMENT AND STRENGTH ASSESSMENTS
ANY OF YOUR USUAL WORK, HOUSEWORK OR SCHOOL ACTIVITIES: A LITTLE BIT OF DIFFICULTY
MAKING SHARP TURNS WHILE RUNNING FAST: EXTREME DIFFICULTY OR UNABLE TO PERFORM ACTIVITY
ROLLING OVER IN BED: QUITE A BIT OF DIFFICULTY
TOTAL SCORE: 36.25
PUTTING ON YOUR SHOES OR SOCKS: QUITE A BIT OF DIFFICULTY
GETTING INTO OR OUT OF A CAR: MODERATE DIFFICULTY
PERFORMING HEAVY ACTIVITIES AROUND YOUR HOME: EXTREME DIFFICULTY OR UNABLE TO PERFORM ACTIVITY
WALKING BETWEEN ROOMS: A LITTLE BIT OF DIFFICULTY
WALKING A MILE: EXTREME DIFFICULTY OR UNABLE TO PERFORM ACTIVITY
STANDING FOR 1 HOUR: QUITE A BIT OF DIFFICULTY
HOPPING: EXTREME DIFFICULTY OR UNABLE TO PERFORM ACTIVITY
LIFTING AN OBJECT, LIKE A BAG OF GROCERIES, FROM THE FLOOR: MODERATE DIFFICULTY
SQUATTING: QUITE A BIT OF DIFFICULTY
PERFORMING LIGHT ACTIVITES AROUND YOUR HOME: A LITTLE BIT OF DIFFICULTY
RUNNING ON UNEVEN GROUND: EXTREME DIFFICULTY OR UNABLE TO PERFORM ACTIVITY
YOUR USUAL HOBBIES, RECREATIONAL OR SPORTING ACTIVIITIES: NO DIFFICULTY
WALKING 2 BLOCKS: A LITTLE BIT OF DIFFICULTY
GOING UP OR DOWN 10 STAIRS (ABOUT 1 FLIGHT OF STAIRS): QUITE A BIT OF DIFFICULTY
GETTING INTO OR OUT OF THE BATH: QUITE A BIT OF DIFFICULTY
SITTING FOR 1 HOUR: A LITTLE BIT OF DIFFICULTY
RUNNING ON EVEN GROUND: EXTREME DIFFICULTY OR UNABLE TO PERFORM ACTIVITY

## 2025-03-03 ASSESSMENT — ENCOUNTER SYMPTOMS
ALLEVIATING FACTORS: AVOIDING MOVEMENT IN INVOLVED AREA
PAIN SEVERITY NOW: 0
ALLEVIATING FACTORS: CHANGE IN POSITION
PAIN SCALE AT HIGHEST: 6
QUALITY: ACHE

## 2025-03-10 ENCOUNTER — APPOINTMENT (OUTPATIENT)
Dept: PHYSICAL MEDICINE AND REHAB | Age: 80
End: 2025-03-10
Attending: INTERNAL MEDICINE

## 2025-03-10 PROCEDURE — 97140 MANUAL THERAPY 1/> REGIONS: CPT

## 2025-03-10 PROCEDURE — 97110 THERAPEUTIC EXERCISES: CPT

## 2025-03-17 ENCOUNTER — APPOINTMENT (OUTPATIENT)
Dept: PHYSICAL MEDICINE AND REHAB | Age: 80
End: 2025-03-17
Attending: INTERNAL MEDICINE

## 2025-03-17 PROCEDURE — 97110 THERAPEUTIC EXERCISES: CPT

## 2025-03-17 PROCEDURE — 97140 MANUAL THERAPY 1/> REGIONS: CPT

## 2025-03-24 ENCOUNTER — APPOINTMENT (OUTPATIENT)
Dept: PHYSICAL MEDICINE AND REHAB | Age: 80
End: 2025-03-24
Attending: INTERNAL MEDICINE

## 2025-03-24 PROCEDURE — 97140 MANUAL THERAPY 1/> REGIONS: CPT

## 2025-03-24 PROCEDURE — 97110 THERAPEUTIC EXERCISES: CPT

## 2025-03-24 ASSESSMENT — MOVEMENT AND STRENGTH ASSESSMENTS
WALKING 2 BLOCKS: NO DIFFICULTY
WALKING BETWEEN ROOMS: NO DIFFICULTY
HOPPING: QUITE A BIT OF DIFFICULTY
RUNNING ON EVEN GROUND: QUITE A BIT OF DIFFICULTY
GOING UP OR DOWN 10 STAIRS (ABOUT 1 FLIGHT OF STAIRS): A LITTLE BIT OF DIFFICULTY
STANDING FOR 1 HOUR: A LITTLE BIT OF DIFFICULTY
PERFORMING HEAVY ACTIVITIES AROUND YOUR HOME: NO DIFFICULTY
SITTING FOR 1 HOUR: A LITTLE BIT OF DIFFICULTY
LIFTING AN OBJECT, LIKE A BAG OF GROCERIES, FROM THE FLOOR: A LITTLE BIT OF DIFFICULTY
SQUATTING: A LITTLE BIT OF DIFFICULTY
YOUR USUAL HOBBIES, RECREATIONAL OR SPORTING ACTIVIITIES: NO DIFFICULTY
PUTTING ON YOUR SHOES OR SOCKS: NO DIFFICULTY
TOTAL SCORE: 75
GETTING INTO OR OUT OF THE BATH: A LITTLE BIT OF DIFFICULTY
WALKING A MILE: MODERATE DIFFICULTY
MAKING SHARP TURNS WHILE RUNNING FAST: QUITE A BIT OF DIFFICULTY
GETTING INTO OR OUT OF A CAR: NO DIFFICULTY
RUNNING ON UNEVEN GROUND: QUITE A BIT OF DIFFICULTY
ANY OF YOUR USUAL WORK, HOUSEWORK OR SCHOOL ACTIVITIES: NO DIFFICULTY
ROLLING OVER IN BED: NO DIFFICULTY
PERFORMING LIGHT ACTIVITES AROUND YOUR HOME: NO DIFFICULTY

## 2025-03-31 ENCOUNTER — APPOINTMENT (OUTPATIENT)
Dept: PHYSICAL MEDICINE AND REHAB | Age: 80
End: 2025-03-31
Attending: INTERNAL MEDICINE

## 2025-04-07 ENCOUNTER — APPOINTMENT (OUTPATIENT)
Dept: PHYSICAL MEDICINE AND REHAB | Age: 80
End: 2025-04-07
Attending: INTERNAL MEDICINE

## (undated) NOTE — MR AVS SNAPSHOT
[place] [address] [facility] [address]  [phone]               Thank you for choosing us for your health care visit with Naty Burton MD.  We are glad to serve you and happy to provide you with this summary of · Stosowa? keith alcaraze, br?zowy ry? i fasol? .  · Je?? 2–3 porcje mlecznych produktów nisko-, lub bezt?janneth  · Becki Duffy? gamal long o diet? DASH. Dieta ta pomo?e zmniejszy? warto?? ci?nienia krwi. Utrzymywa? w?a?ciw? mas? elba?a  · Becki Duffy?  ?wia [address]121. All rights reserved. This information is not intended as a substitute for professional medical care. Always follow your healthcare professional's instructions.              Allergies as of Feb 04, 2017     No Known Allergies                Today's Vi Chlamydia/Gc Amplification [E]    Complete by: Feb 04, 2017    Assoc Dx:  Screening for chlamydial disease [Z11.8]           JYOTI SCREENING BILAT (CPT=77067)    Complete by:   Feb 04, 2017 (Approximate)    Assoc Dx:  Visit for screening mammogram [Z12.31] Tips for increasing your physical activity – Adults who are physically active are less likely to develop some chronic diseases than adults who are inactive.      HOW TO GET STARTED: HOW TO STAY MOTIVATED:   Start activities slowly and build up over time Do

## (undated) NOTE — LETTER
08/17/18        [address] [place] [id number]      Dear Patel Koehler,    [facility] records indicate that you have outstanding lab work and or testing that was ordered for you and has not yet been completed:          CBC W Differential W Platelet [

## (undated) NOTE — LETTER
10/19/18        [address] [place] [id number]      Dear Makenzie Ip,    [facility] records indicate that you have lab work and or testing that was ordered for you and has not yet been completed:        TSH and Free T4 [E]    To provide you with th